# Patient Record
Sex: FEMALE | Race: BLACK OR AFRICAN AMERICAN | NOT HISPANIC OR LATINO | Employment: OTHER | URBAN - METROPOLITAN AREA
[De-identification: names, ages, dates, MRNs, and addresses within clinical notes are randomized per-mention and may not be internally consistent; named-entity substitution may affect disease eponyms.]

---

## 2017-11-27 ENCOUNTER — HISTORICAL (OUTPATIENT)
Dept: RADIOLOGY | Facility: HOSPITAL | Age: 57
End: 2017-11-27

## 2018-07-19 ENCOUNTER — HISTORICAL (OUTPATIENT)
Dept: RADIOLOGY | Facility: HOSPITAL | Age: 58
End: 2018-07-19

## 2019-03-08 ENCOUNTER — HISTORICAL (OUTPATIENT)
Dept: RADIOLOGY | Facility: HOSPITAL | Age: 59
End: 2019-03-08

## 2019-10-05 LAB
COLOR STL: ABNORMAL
CONSISTENCY STL: ABNORMAL
HEMOCCULT SP1 STL QL: POSITIVE

## 2019-10-06 LAB
COLOR STL: ABNORMAL
CONSISTENCY STL: ABNORMAL
HEMOCCULT SP2 STL QL: POSITIVE

## 2019-10-07 LAB
COLOR STL: NORMAL
CONSISTENCY STL: NORMAL

## 2019-10-08 ENCOUNTER — HISTORICAL (OUTPATIENT)
Dept: LAB | Facility: HOSPITAL | Age: 59
End: 2019-10-08

## 2020-08-31 ENCOUNTER — HISTORICAL (OUTPATIENT)
Dept: RADIOLOGY | Facility: HOSPITAL | Age: 60
End: 2020-08-31

## 2021-09-02 ENCOUNTER — HISTORICAL (OUTPATIENT)
Dept: RADIOLOGY | Facility: HOSPITAL | Age: 61
End: 2021-09-02

## 2022-09-28 ENCOUNTER — HOSPITAL ENCOUNTER (OUTPATIENT)
Dept: RADIOLOGY | Facility: HOSPITAL | Age: 62
Discharge: HOME | End: 2022-09-28
Attending: NURSE PRACTITIONER
Payer: MEDICAID

## 2022-09-28 DIAGNOSIS — Z13.820 ENCOUNTER FOR OSTEOPOROSIS SCREENING IN ASYMPTOMATIC POSTMENOPAUSAL PATIENT: ICD-10-CM

## 2022-09-28 DIAGNOSIS — Z78.0 ASYMPTOMATIC AGE-RELATED POSTMENOPAUSAL STATE: ICD-10-CM

## 2022-09-28 DIAGNOSIS — Z12.31 BREAST CANCER SCREENING BY MAMMOGRAM: ICD-10-CM

## 2022-09-28 DIAGNOSIS — Z78.0 ENCOUNTER FOR OSTEOPOROSIS SCREENING IN ASYMPTOMATIC POSTMENOPAUSAL PATIENT: ICD-10-CM

## 2022-09-28 PROCEDURE — 77063 MAMMO DIGITAL SCREENING BILAT WITH TOMO: ICD-10-PCS | Mod: 26,,, | Performed by: RADIOLOGY

## 2022-09-28 PROCEDURE — 77080 DXA BONE DENSITY AXIAL: CPT | Mod: TC

## 2022-09-28 PROCEDURE — 77067 SCR MAMMO BI INCL CAD: CPT | Mod: TC

## 2022-09-28 PROCEDURE — 77067 SCR MAMMO BI INCL CAD: CPT | Mod: 26,,, | Performed by: RADIOLOGY

## 2022-09-28 PROCEDURE — 77067 MAMMO DIGITAL SCREENING BILAT WITH TOMO: ICD-10-PCS | Mod: 26,,, | Performed by: RADIOLOGY

## 2022-09-28 PROCEDURE — 77063 BREAST TOMOSYNTHESIS BI: CPT | Mod: 26,,, | Performed by: RADIOLOGY

## 2023-10-13 ENCOUNTER — HOSPITAL ENCOUNTER (OUTPATIENT)
Dept: RADIOLOGY | Facility: HOSPITAL | Age: 63
Discharge: HOME | End: 2023-10-13
Attending: NURSE PRACTITIONER
Payer: MEDICAID

## 2023-10-13 DIAGNOSIS — Z12.31 SCREENING MAMMOGRAM, ENCOUNTER FOR: ICD-10-CM

## 2023-10-13 PROCEDURE — 77063 BREAST TOMOSYNTHESIS BI: CPT | Mod: 26,,, | Performed by: RADIOLOGY

## 2023-10-13 PROCEDURE — 77063 MAMMO DIGITAL SCREENING BILAT WITH TOMO: ICD-10-PCS | Mod: 26,,, | Performed by: RADIOLOGY

## 2023-10-13 PROCEDURE — 77067 SCR MAMMO BI INCL CAD: CPT | Mod: 26,,, | Performed by: RADIOLOGY

## 2023-10-13 PROCEDURE — 77067 MAMMO DIGITAL SCREENING BILAT WITH TOMO: ICD-10-PCS | Mod: 26,,, | Performed by: RADIOLOGY

## 2023-10-13 PROCEDURE — 77067 SCR MAMMO BI INCL CAD: CPT | Mod: TC

## 2023-11-15 ENCOUNTER — HOSPITAL ENCOUNTER (OUTPATIENT)
Dept: RADIOLOGY | Facility: HOSPITAL | Age: 63
Discharge: HOME | End: 2023-11-15
Attending: NURSE PRACTITIONER
Payer: MEDICAID

## 2023-11-15 DIAGNOSIS — R06.2 WHEEZING: ICD-10-CM

## 2023-11-15 PROCEDURE — 71046 X-RAY EXAM CHEST 2 VIEWS: CPT | Mod: TC

## 2024-01-15 ENCOUNTER — OFFICE VISIT (OUTPATIENT)
Dept: URGENT CARE | Facility: CLINIC | Age: 64
End: 2024-01-15
Payer: MEDICAID

## 2024-01-15 ENCOUNTER — HOSPITAL ENCOUNTER (EMERGENCY)
Facility: HOSPITAL | Age: 64
Discharge: HOME | End: 2024-01-15
Attending: EMERGENCY MEDICINE
Payer: MEDICAID

## 2024-01-15 VITALS
HEART RATE: 84 BPM | SYSTOLIC BLOOD PRESSURE: 139 MMHG | OXYGEN SATURATION: 99 % | TEMPERATURE: 98 F | HEIGHT: 64 IN | BODY MASS INDEX: 30.39 KG/M2 | WEIGHT: 178 LBS | DIASTOLIC BLOOD PRESSURE: 71 MMHG | RESPIRATION RATE: 17 BRPM

## 2024-01-15 VITALS
SYSTOLIC BLOOD PRESSURE: 144 MMHG | HEIGHT: 64 IN | TEMPERATURE: 99 F | BODY MASS INDEX: 30.5 KG/M2 | RESPIRATION RATE: 16 BRPM | WEIGHT: 178.63 LBS | HEART RATE: 81 BPM | DIASTOLIC BLOOD PRESSURE: 80 MMHG

## 2024-01-15 DIAGNOSIS — R10.9 ABDOMINAL PAIN, UNSPECIFIED ABDOMINAL LOCATION: ICD-10-CM

## 2024-01-15 DIAGNOSIS — R10.10 UPPER ABDOMINAL PAIN: Primary | ICD-10-CM

## 2024-01-15 DIAGNOSIS — R10.84 GENERALIZED ABDOMINAL PAIN: Primary | ICD-10-CM

## 2024-01-15 DIAGNOSIS — R11.2 NAUSEA AND VOMITING, UNSPECIFIED VOMITING TYPE: ICD-10-CM

## 2024-01-15 LAB
ALBUMIN SERPL-MCNC: 3.7 G/DL (ref 3.4–4.8)
ALBUMIN/GLOB SERPL: 1.1 RATIO (ref 1.1–2)
ALP SERPL-CCNC: 77 UNIT/L (ref 40–150)
ALT SERPL-CCNC: 45 UNIT/L (ref 0–55)
APPEARANCE UR: CLEAR
AST SERPL-CCNC: 33 UNIT/L (ref 5–34)
BACTERIA #/AREA URNS AUTO: ABNORMAL /HPF
BASOPHILS # BLD AUTO: 0.01 X10(3)/MCL
BASOPHILS NFR BLD AUTO: 0.2 %
BILIRUB SERPL-MCNC: 0.8 MG/DL
BILIRUB UR QL STRIP.AUTO: NEGATIVE
BUN SERPL-MCNC: 13.3 MG/DL (ref 9.8–20.1)
CALCIUM SERPL-MCNC: 8.9 MG/DL (ref 8.4–10.2)
CHLORIDE SERPL-SCNC: 112 MMOL/L (ref 98–107)
CO2 SERPL-SCNC: 22 MMOL/L (ref 23–31)
COLOR UR AUTO: ABNORMAL
CREAT SERPL-MCNC: 0.84 MG/DL (ref 0.55–1.02)
EOSINOPHIL # BLD AUTO: 0.08 X10(3)/MCL (ref 0–0.9)
EOSINOPHIL NFR BLD AUTO: 1.4 %
ERYTHROCYTE [DISTWIDTH] IN BLOOD BY AUTOMATED COUNT: 15.4 % (ref 11.5–17)
FLUAV AG UPPER RESP QL IA.RAPID: NOT DETECTED
FLUBV AG UPPER RESP QL IA.RAPID: NOT DETECTED
GFR SERPLBLD CREATININE-BSD FMLA CKD-EPI: >60 MLS/MIN/1.73/M2
GLOBULIN SER-MCNC: 3.3 GM/DL (ref 2.4–3.5)
GLUCOSE SERPL-MCNC: 124 MG/DL (ref 82–115)
GLUCOSE UR QL STRIP.AUTO: NORMAL
HCT VFR BLD AUTO: 37.6 % (ref 37–47)
HGB BLD-MCNC: 12.4 G/DL (ref 12–16)
HOLD SPECIMEN: NORMAL
HYALINE CASTS #/AREA URNS LPF: ABNORMAL /LPF
IMM GRANULOCYTES # BLD AUTO: 0.02 X10(3)/MCL (ref 0–0.04)
IMM GRANULOCYTES NFR BLD AUTO: 0.4 %
KETONES UR QL STRIP.AUTO: NEGATIVE
LEUKOCYTE ESTERASE UR QL STRIP.AUTO: 75
LIPASE SERPL-CCNC: 21 U/L
LYMPHOCYTES # BLD AUTO: 0.64 X10(3)/MCL (ref 0.6–4.6)
LYMPHOCYTES NFR BLD AUTO: 11.4 %
MCH RBC QN AUTO: 31.6 PG (ref 27–31)
MCHC RBC AUTO-ENTMCNC: 33 G/DL (ref 33–36)
MCV RBC AUTO: 95.7 FL (ref 80–94)
MONOCYTES # BLD AUTO: 0.2 X10(3)/MCL (ref 0.1–1.3)
MONOCYTES NFR BLD AUTO: 3.6 %
NEUTROPHILS # BLD AUTO: 4.64 X10(3)/MCL (ref 2.1–9.2)
NEUTROPHILS NFR BLD AUTO: 83 %
NITRITE UR QL STRIP.AUTO: NEGATIVE
NRBC BLD AUTO-RTO: 0 %
PH UR STRIP.AUTO: 7.5 [PH]
PLATELET # BLD AUTO: 288 X10(3)/MCL (ref 130–400)
PMV BLD AUTO: 10.6 FL (ref 7.4–10.4)
POTASSIUM SERPL-SCNC: 3.7 MMOL/L (ref 3.5–5.1)
PROT SERPL-MCNC: 7 GM/DL (ref 5.8–7.6)
PROT UR QL STRIP.AUTO: NEGATIVE
RBC # BLD AUTO: 3.93 X10(6)/MCL (ref 4.2–5.4)
RBC #/AREA URNS AUTO: ABNORMAL /HPF
RBC UR QL AUTO: NEGATIVE
SARS-COV-2 RNA RESP QL NAA+PROBE: NOT DETECTED
SODIUM SERPL-SCNC: 143 MMOL/L (ref 136–145)
SP GR UR STRIP.AUTO: >1.05 (ref 1–1.03)
SQUAMOUS #/AREA URNS LPF: ABNORMAL /HPF
UROBILINOGEN UR STRIP-ACNC: NORMAL
WBC # SPEC AUTO: 5.59 X10(3)/MCL (ref 4.5–11.5)
WBC #/AREA URNS AUTO: ABNORMAL /HPF

## 2024-01-15 PROCEDURE — 0240U COVID/FLU A&B PCR: CPT | Performed by: PHYSICIAN ASSISTANT

## 2024-01-15 PROCEDURE — 99285 EMERGENCY DEPT VISIT HI MDM: CPT | Mod: 25,27

## 2024-01-15 PROCEDURE — 25000003 PHARM REV CODE 250: Performed by: PHYSICIAN ASSISTANT

## 2024-01-15 PROCEDURE — 80053 COMPREHEN METABOLIC PANEL: CPT | Performed by: PHYSICIAN ASSISTANT

## 2024-01-15 PROCEDURE — 25500020 PHARM REV CODE 255

## 2024-01-15 PROCEDURE — 63600175 PHARM REV CODE 636 W HCPCS: Performed by: PHYSICIAN ASSISTANT

## 2024-01-15 PROCEDURE — 96361 HYDRATE IV INFUSION ADD-ON: CPT

## 2024-01-15 PROCEDURE — 99213 OFFICE O/P EST LOW 20 MIN: CPT | Mod: PBBFAC,25 | Performed by: NURSE PRACTITIONER

## 2024-01-15 PROCEDURE — 85025 COMPLETE CBC W/AUTO DIFF WBC: CPT | Performed by: PHYSICIAN ASSISTANT

## 2024-01-15 PROCEDURE — 96374 THER/PROPH/DIAG INJ IV PUSH: CPT

## 2024-01-15 PROCEDURE — 99205 OFFICE O/P NEW HI 60 MIN: CPT | Mod: S$PBB,,, | Performed by: NURSE PRACTITIONER

## 2024-01-15 PROCEDURE — 81001 URINALYSIS AUTO W/SCOPE: CPT | Performed by: PHYSICIAN ASSISTANT

## 2024-01-15 PROCEDURE — 83690 ASSAY OF LIPASE: CPT | Performed by: PHYSICIAN ASSISTANT

## 2024-01-15 RX ORDER — ONDANSETRON HYDROCHLORIDE 2 MG/ML
4 INJECTION, SOLUTION INTRAVENOUS
Status: COMPLETED | OUTPATIENT
Start: 2024-01-15 | End: 2024-01-15

## 2024-01-15 RX ORDER — DICYCLOMINE HYDROCHLORIDE 10 MG/1
10 CAPSULE ORAL 3 TIMES DAILY PRN
Qty: 30 CAPSULE | Refills: 0 | Status: SHIPPED | OUTPATIENT
Start: 2024-01-15 | End: 2024-01-25

## 2024-01-15 RX ORDER — ONDANSETRON 4 MG/1
4 TABLET, ORALLY DISINTEGRATING ORAL EVERY 8 HOURS PRN
Qty: 20 TABLET | Refills: 0 | Status: SHIPPED | OUTPATIENT
Start: 2024-01-15

## 2024-01-15 RX ADMIN — ONDANSETRON 4 MG: 2 INJECTION INTRAMUSCULAR; INTRAVENOUS at 06:01

## 2024-01-15 RX ADMIN — SODIUM CHLORIDE 1000 ML: 9 INJECTION, SOLUTION INTRAVENOUS at 06:01

## 2024-01-15 RX ADMIN — IOHEXOL 100 ML: 350 INJECTION, SOLUTION INTRAVENOUS at 06:01

## 2024-01-15 NOTE — PATIENT INSTRUCTIONS
Referred to emergency department severe upper abdominal pain and projectile vomiting and diarrhea.  Hospital level of care evaluation for severe abdominal pain r/o appendicitis, colitis or peptic ulcer

## 2024-01-15 NOTE — PROGRESS NOTES
"Subjective:      Patient ID: Anahi Parmar is a 63 y.o. female.    Vitals:  height is 5' 4" (1.626 m) and weight is 81 kg (178 lb 9.6 oz). Her oral temperature is 98.5 °F (36.9 °C). Her blood pressure is 144/80 (abnormal) and her pulse is 81. Her respiration is 16 and oxygen saturation is 100% (pended).     Chief Complaint: Abdominal Cramping (Started today), Fatigue, and Emesis    Abdominal Cramping  Associated symptoms include vomiting.   Fatigue  Associated symptoms include fatigue and vomiting.   Emesis      Patient presents with c/o generalized abdominal pain, nausea vomiting and diarrhea with bloating.  No alleviating factors. Worsening abdominal pain and bloating in last hour. Pt denies taking any medication.  Patient does report eating a lot of spicy foods in the last week.  Pt denies cough, chest pain, shortness of breath, headache, dizziness, dysuria, rectal bleeding vaginal discharge odor, or itching. Hx GERD on famotidine, Diabetes, reports compliance with medications.    Constitution: Positive for fatigue.   Gastrointestinal:  Positive for vomiting.      Objective:     Physical Exam   Constitutional: She is oriented to person, place, and time. She appears well-developed.  Non-toxic appearance. She does not appear ill. No distress.   HENT:   Head: Normocephalic and atraumatic.   Ears:   Right Ear: Tympanic membrane normal.   Left Ear: Tympanic membrane normal.   Nose: Nose normal. No purulent discharge. Right sinus exhibits no maxillary sinus tenderness and no frontal sinus tenderness. Left sinus exhibits no maxillary sinus tenderness and no frontal sinus tenderness.   Mouth/Throat: Uvula is midline. Mucous membranes are dry.   Eyes: Conjunctivae are normal. Right eye exhibits no discharge. Left eye exhibits no discharge. Extraocular movement intact   Neck: Neck supple. No neck rigidity present.   Cardiovascular: Regular rhythm and normal pulses.   Pulmonary/Chest: Effort normal and breath sounds normal. " No respiratory distress. She has no wheezes. She has no rales.   Abdominal: Normal appearance and bowel sounds are normal. She exhibits distension. Soft. There is abdominal tenderness in the right upper quadrant and left upper quadrant. There is rebound and guarding. There is no tenderness at McBurney's point, negative Hernandez's sign, no left CVA tenderness, negative Rovsing's sign, negative psoas sign, no right CVA tenderness and negative obturator sign.     Lymphadenopathy:     She has no cervical adenopathy.   Neurological: She is alert and oriented to person, place, and time.   Skin: Skin is warm, dry and not diaphoretic. Capillary refill takes less than 2 seconds.   Psychiatric: Her behavior is normal. Mood, judgment and thought content normal.   Nursing note and vitals reviewed.      Assessment:     1. Abdominal pain, unspecified abdominal location    2. Upper abdominal pain        Plan:   Hospital level of care evaluation for severe abdominal pain r/o appendicitis, colitis or peptic ulcer    Abdominal pain, unspecified abdominal location  -     Cancel: POCT Urinalysis, Dipstick, Automated, W/O Scope  -     Refer to Emergency Dept.    Upper abdominal pain  -     Refer to Emergency Dept.

## 2024-01-15 NOTE — ED PROVIDER NOTES
Encounter Date: 1/15/2024       History     Chief Complaint   Patient presents with    Abdominal Pain     Pt sent from urgent care for generalized abd pain with vomiting starting this morning; denies diarrhea.      Anahi Parmar is  a 63 y.o. female with a history of GERD and diabetes who presents to the ED complaining of generalized abdominal pain, nausea, and vomiting that started this morning. Symptoms have been constant all day with no alleviating or exacerbating factors. Reports that she feels bloated. Patient does report eating a lot of spicy foods in the last week. Denies excessive NSAID/alcohol use. Pt denies cough, chest pain, shortness of breath, headache, dizziness, dysuria, rectal bleeding vaginal discharge odor, or itching.    The history is provided by the patient.     Review of patient's allergies indicates:   Allergen Reactions    Diclofenac Swelling    Aspirin      No past medical history on file.  No past surgical history on file.  No family history on file.  Social History     Tobacco Use    Smoking status: Every Day     Types: Cigarettes    Smokeless tobacco: Never     Review of Systems   Constitutional:  Negative for fever.   HENT:  Negative for congestion and sore throat.    Respiratory:  Negative for shortness of breath.    Cardiovascular:  Negative for chest pain.   Gastrointestinal:  Positive for abdominal pain, nausea and vomiting.   Genitourinary:  Negative for dysuria.   Musculoskeletal:  Negative for back pain.   Skin:  Negative for rash.   Neurological:  Negative for weakness.   Hematological:  Does not bruise/bleed easily.       Physical Exam     Initial Vitals [01/15/24 1711]   BP Pulse Resp Temp SpO2   (!) 147/77 81 18 98.2 °F (36.8 °C) 99 %      MAP       --         Physical Exam    Nursing note and vitals reviewed.  Constitutional: She appears well-developed and well-nourished. No distress.   HENT:   Head: Normocephalic and atraumatic.   Mouth/Throat: No oropharyngeal exudate.    Eyes: EOM are normal. No scleral icterus.   Neck: Neck supple.   Normal range of motion.  Cardiovascular:  Normal rate and regular rhythm.           No murmur heard.  Pulmonary/Chest: Breath sounds normal. No respiratory distress. She has no wheezes.   Abdominal: Abdomen is soft. Bowel sounds are normal. She exhibits no distension. There is generalized abdominal tenderness and tenderness in the epigastric area and left upper quadrant. There is no rebound and no guarding.   Musculoskeletal:         General: No tenderness. Normal range of motion.      Cervical back: Normal range of motion and neck supple.     Neurological: She is alert and oriented to person, place, and time. No cranial nerve deficit.   Skin: Skin is warm and dry. Capillary refill takes less than 2 seconds. No erythema.   Psychiatric: She has a normal mood and affect. Her behavior is normal. Judgment and thought content normal.         ED Course   Procedures  Labs Reviewed   COMPREHENSIVE METABOLIC PANEL - Abnormal; Notable for the following components:       Result Value    Chloride 112 (*)     Carbon Dioxide 22 (*)     Glucose Level 124 (*)     All other components within normal limits   URINALYSIS, REFLEX TO URINE CULTURE - Abnormal; Notable for the following components:    Leukocyte Esterase, UA 75 (*)     Bacteria, UA Trace (*)     Squamous Epithelial Cells, UA Occ (*)     All other components within normal limits   CBC WITH DIFFERENTIAL - Abnormal; Notable for the following components:    RBC 3.93 (*)     MCV 95.7 (*)     MCH 31.6 (*)     MPV 10.6 (*)     All other components within normal limits   LIPASE - Normal   COVID/FLU A&B PCR - Normal    Narrative:     The Xpert Xpress SARS-CoV-2/FLU/RSV plus is a rapid, multiplexed real-time PCR test intended for the simultaneous qualitative detection and differentiation of SARS-CoV-2, Influenza A, Influenza B, and respiratory syncytial virus (RSV) viral RNA in either nasopharyngeal swab or nasal swab  specimens.         CBC W/ AUTO DIFFERENTIAL    Narrative:     The following orders were created for panel order CBC auto differential.  Procedure                               Abnormality         Status                     ---------                               -----------         ------                     CBC with Differential[[id number]]       Abnormal            Final result                 Please view results for these tests on the individual orders.   EXTRA TUBES    Narrative:     The following orders were created for panel order EXTRA TUBES.  Procedure                               Abnormality         Status                     ---------                               -----------         ------                     Light Blue Top Hold[[id number]]                             Final result               Lavender Top Hold[[id number]]                               Final result               Gold Top Hold[[id number]]                                   Final result                 Please view results for these tests on the individual orders.   LIGHT BLUE TOP HOLD   LAVENDER TOP HOLD   GOLD TOP HOLD          Imaging Results              CT Abdomen Pelvis With IV Contrast NO Oral Contrast (Final result)  Result time 01/15/24 20:01:12      Final result by Omar Reeder MD (01/15/24 20:01:12)                   Impression:      1. No acute abnormality of the abdomen and pelvis.  2. Enlarged leiomyomatous uterus.      Electronically signed by: Omar Reeder MD  Date:    01/15/2024  Time:    20:01               Narrative:    EXAMINATION:  CT ABDOMEN PELVIS WITH IV CONTRAST    CLINICAL HISTORY:  Abdominal pain, acute, nonlocalized;    TECHNIQUE:  Axial images of the abdomen and pelvis were obtained with IV contrast administration.  Coronal and sagittal reconstructions were provided.  Three dimensional and MIP images were obtained and evaluated.  Total DLP was 316 mGy-cm. Dose lowering technique and automated exposure control  were utilized for this exam.    COMPARISON:  None    FINDINGS:  The bilateral lung bases are clear.  The heart is normal in size.    There is a small area of focal fatty deposition within the liver adjacent to the fissure of ligamentum teres.  The liver is otherwise homogeneous.  The portal vein is patent.  The gallbladder, spleen, pancreas, and bilateral adrenal glands are.  The bilateral kidneys are normal.  There is no hydronephrosis or nephrolithiasis.    The stomach and small bowel are decompressed.  There is no small bowel obstruction.  The appendix is normal.  The colon is normal.  There is enlarged leiomyomatous uterus.  The largest fibroid in the fundus measures approximately 6.4 cm.  The urinary bladder is normal.  There is no pelvic or retroperitoneal lymphadenopathy.  The aorta is nonaneurysmal.  There is no lytic or blastic osseous lesion.                                       Medications   ondansetron injection 4 mg (4 mg Intravenous Given 1/15/24 1807)   sodium chloride 0.9% bolus 1,000 mL 1,000 mL (0 mLs Intravenous Stopped 1/15/24 1913)   iohexoL (OMNIPAQUE 350) 350 mg iodine/mL injection (100 mLs Intravenous Given 1/15/24 1845)     Medical Decision Making  Differential: gastroenteritis, cholecystitis, pancreatitis, UTI    ED management: pt resting comfortably in NAD. HDS and afebrile. Generalized abdominal tenderness, worse in epigastrium and LUQ. No rebound or guarding. No CVA tenderness.     Amount and/or Complexity of Data Reviewed  Labs: ordered.  Radiology: ordered.    Risk  Prescription drug management.               ED Course as of 01/15/24 2019   Mon Oscar 15, 2024   1921 Notified by RT that radiologists power is out and he is no longer able to read imaging. [place] will begin in approximately 1-2 hrs.  [KD]   1939 Pt states she feels significantly better and is requesting discharge. She does not want to wait for final CT read. Suspect gastroenteritis. She has not vomited since receiving  zofran. Will discharge and call if any abnormalities.  [KD]      ED Course User Index  [KD] Denisha Villanueva PA-C                           Clinical Impression:  Final diagnoses:  [R10.84] Generalized abdominal pain (Primary)  [R11.2] Nausea and vomiting, unspecified vomiting type          ED Disposition Condition    Discharge Stable          ED Prescriptions       Medication Sig Dispense Start Date End Date Auth. Provider    ondansetron (ZOFRAN-ODT) 4 MG TbDL Take 1 tablet (4 mg total) by mouth every 8 (eight) hours as needed (nausea/vomiting). 20 tablet 1/15/2024 -- Denisha Villanueva PA-C    dicyclomine (BENTYL) 10 MG capsule Take 1 capsule (10 mg total) by mouth 3 (three) times daily as needed (abdominal cramps). 30 capsule 1/15/2024 1/25/2024 Denisha Villanueva PA-C          Follow-up Information       Follow up With Specialties Details Why Contact Info    [facility] - Emergency Dept Emergency Medicine  If symptoms worsen [address] [facility] [address] [phone]    PCP  In 3 days Hospital follow up              Denisha Villanueva PA-C  01/15/24 2020

## 2024-07-08 ENCOUNTER — OFFICE VISIT (OUTPATIENT)
Dept: URGENT CARE | Facility: CLINIC | Age: 64
End: 2024-07-08
Payer: MEDICAID

## 2024-07-08 VITALS
SYSTOLIC BLOOD PRESSURE: 135 MMHG | BODY MASS INDEX: 30.75 KG/M2 | HEART RATE: 71 BPM | DIASTOLIC BLOOD PRESSURE: 81 MMHG | RESPIRATION RATE: 20 BRPM | HEIGHT: 64 IN | WEIGHT: 180.13 LBS | OXYGEN SATURATION: 100 % | TEMPERATURE: 98 F

## 2024-07-08 DIAGNOSIS — M26.609 TMJ (TEMPOROMANDIBULAR JOINT DISORDER): Primary | ICD-10-CM

## 2024-07-08 DIAGNOSIS — J02.9 SORE THROAT: ICD-10-CM

## 2024-07-08 LAB
CTP QC/QA: YES
GLUCOSE SERPL-MCNC: 83 MG/DL (ref 70–110)
MOLECULAR STREP A: NEGATIVE

## 2024-07-08 PROCEDURE — 82962 GLUCOSE BLOOD TEST: CPT | Mod: PBBFAC | Performed by: FAMILY MEDICINE

## 2024-07-08 PROCEDURE — 87651 STREP A DNA AMP PROBE: CPT | Mod: PBBFAC | Performed by: FAMILY MEDICINE

## 2024-07-08 PROCEDURE — 99215 OFFICE O/P EST HI 40 MIN: CPT | Mod: PBBFAC | Performed by: FAMILY MEDICINE

## 2024-07-08 PROCEDURE — 99213 OFFICE O/P EST LOW 20 MIN: CPT | Mod: S$PBB,,, | Performed by: FAMILY MEDICINE

## 2024-07-08 RX ORDER — GABAPENTIN 400 MG/1
400 CAPSULE ORAL 3 TIMES DAILY
COMMUNITY

## 2024-07-08 RX ORDER — MECLIZINE HYDROCHLORIDE 25 MG/1
25 TABLET ORAL
COMMUNITY
Start: 2022-01-07

## 2024-07-08 RX ORDER — FAMOTIDINE 40 MG/1
40 TABLET, FILM COATED ORAL
COMMUNITY
Start: 2024-01-15

## 2024-07-08 RX ORDER — PREDNISONE 20 MG/1
20 TABLET ORAL 2 TIMES DAILY
Qty: 5 TABLET | Refills: 0 | Status: SHIPPED | OUTPATIENT
Start: 2024-07-08 | End: 2024-07-13

## 2024-07-08 RX ORDER — GLIPIZIDE 2.5 MG/1
5 TABLET, EXTENDED RELEASE ORAL
COMMUNITY

## 2024-07-08 NOTE — PROGRESS NOTES
"Subjective:       Patient ID: Anahi Parmar is a 64 y.o. female.    Vitals:  height is 5' 4" (1.626 m) and weight is 81.7 kg (180 lb 1.9 oz). Her oral temperature is 97.6 °F (36.4 °C). Her blood pressure is 135/81 and her pulse is 71. Her respiration is 20 and oxygen saturation is 100%.     Chief Complaint: Sore Throat (X2 weeks  throat pain, pain when swallowing, and headaches. States no congestion or cough.)    Patient with what sounds like recurrent right mandibular pain, points to the right TMJ.  States this has been present for years.  Denies sore throat, no difficulty swallowing.  No fever.  Followed at [facility], [place]    All other systems are negative    Chart reviewed    Objective:   Physical Exam   Constitutional: She appears well-developed.  Non-toxic appearance. She does not appear ill. No distress.   HENT:   Head: Atraumatic.   Ears:   Right Ear: Tympanic membrane, external ear and ear canal normal.   Left Ear: Tympanic membrane, external ear and ear canal normal.   Nose: No purulent discharge. Right sinus exhibits no maxillary sinus tenderness and no frontal sinus tenderness. Left sinus exhibits no maxillary sinus tenderness and no frontal sinus tenderness.   Mouth/Throat: Uvula is midline.      Comments: Right TMJ with faint click and tenderness.  No soft tissue swelling.  Parotid within normal limits.  No tenderness of temporal artery.  Eyes: Right eye exhibits no discharge. Left eye exhibits no discharge. Extraocular movement intact   Neck: Neck supple. No neck rigidity present.   Cardiovascular: Regular rhythm.   Pulmonary/Chest: Effort normal and breath sounds normal. No respiratory distress. She has no wheezes. She has no rales.   Lymphadenopathy:     She has no cervical adenopathy.   Neurological: She is alert.   Skin: Skin is warm, dry and not diaphoretic.   Psychiatric: Her behavior is normal.   Nursing note and vitals reviewed.      Results for orders placed or performed in visit on 07/08/24   POCT " Strep A, Molecular   Result Value Ref Range    Molecular Strep A, POC Negative Negative     Acceptable Yes    POCT Glucose, Hand-Held Device   Result Value Ref Range    POC Glucose 83 70 - 110 MG/DL       Assessment:     1. TMJ (temporomandibular joint disorder)    2. Sore throat            Plan:   Discussed what is likely TMJ on the right.  Patient allergic to NSAIDs.  Will give a course of prednisone which she has tolerated in the past.  She does have a history of diabetes but glucose has been well controlled.  Instructed her to see dentist at [facility] for further evaluation.  Return to urgent care if we can help.      TMJ (temporomandibular joint disorder)  -     predniSONE (DELTASONE) 20 MG tablet; Take 1 tablet (20 mg total) by mouth 2 (two) times daily. for 5 days  Dispense: 5 tablet; Refill: 0    Sore throat  -     POCT Strep A, Molecular  -     POCT Glucose, Hand-Held Device        Please note: This chart was completed via voice to text dictation. It may contain typographical/word recognition errors. If there are any questions, please contact the provider for final clarification.

## 2024-09-22 ENCOUNTER — HOSPITAL ENCOUNTER (EMERGENCY)
Facility: HOSPITAL | Age: 64
Discharge: HOME | End: 2024-09-22
Attending: INTERNAL MEDICINE
Payer: MEDICAID

## 2024-09-22 VITALS
DIASTOLIC BLOOD PRESSURE: 84 MMHG | HEIGHT: 64 IN | OXYGEN SATURATION: 100 % | TEMPERATURE: 98 F | SYSTOLIC BLOOD PRESSURE: 136 MMHG | RESPIRATION RATE: 20 BRPM | WEIGHT: 181.69 LBS | HEART RATE: 67 BPM | BODY MASS INDEX: 31.02 KG/M2

## 2024-09-22 DIAGNOSIS — G89.29 CHRONIC BILATERAL LOW BACK PAIN WITH BILATERAL SCIATICA: Primary | ICD-10-CM

## 2024-09-22 DIAGNOSIS — M54.41 CHRONIC BILATERAL LOW BACK PAIN WITH BILATERAL SCIATICA: Primary | ICD-10-CM

## 2024-09-22 DIAGNOSIS — M54.42 CHRONIC BILATERAL LOW BACK PAIN WITH BILATERAL SCIATICA: Primary | ICD-10-CM

## 2024-09-22 LAB — POCT GLUCOSE: 61 MG/DL (ref 70–110)

## 2024-09-22 PROCEDURE — 63600175 PHARM REV CODE 636 W HCPCS: Performed by: INTERNAL MEDICINE

## 2024-09-22 PROCEDURE — 99284 EMERGENCY DEPT VISIT MOD MDM: CPT | Mod: 25

## 2024-09-22 PROCEDURE — 82962 GLUCOSE BLOOD TEST: CPT

## 2024-09-22 PROCEDURE — 25000003 PHARM REV CODE 250: Performed by: INTERNAL MEDICINE

## 2024-09-22 PROCEDURE — 96372 THER/PROPH/DIAG INJ SC/IM: CPT | Performed by: INTERNAL MEDICINE

## 2024-09-22 RX ORDER — HYDROCODONE BITARTRATE AND ACETAMINOPHEN 7.5; 325 MG/1; MG/1
1 TABLET ORAL ONCE
Status: COMPLETED | OUTPATIENT
Start: 2024-09-22 | End: 2024-09-22

## 2024-09-22 RX ORDER — METHOCARBAMOL 750 MG/1
1500 TABLET, FILM COATED ORAL 3 TIMES DAILY
Qty: 30 TABLET | Refills: 0 | Status: SHIPPED | OUTPATIENT
Start: 2024-09-22 | End: 2024-09-27

## 2024-09-22 RX ORDER — METHOCARBAMOL 500 MG/1
1500 TABLET, FILM COATED ORAL
Status: COMPLETED | OUTPATIENT
Start: 2024-09-22 | End: 2024-09-22

## 2024-09-22 RX ORDER — DEXAMETHASONE SODIUM PHOSPHATE 4 MG/ML
8 INJECTION, SOLUTION INTRA-ARTICULAR; INTRALESIONAL; INTRAMUSCULAR; INTRAVENOUS; SOFT TISSUE
Status: COMPLETED | OUTPATIENT
Start: 2024-09-22 | End: 2024-09-22

## 2024-09-22 RX ORDER — HYDROCODONE BITARTRATE AND ACETAMINOPHEN 5; 325 MG/1; MG/1
1 TABLET ORAL EVERY 6 HOURS PRN
Qty: 12 TABLET | Refills: 0 | Status: SHIPPED | OUTPATIENT
Start: 2024-09-22

## 2024-09-22 RX ADMIN — METHOCARBAMOL 1500 MG: 500 TABLET ORAL at 01:09

## 2024-09-22 RX ADMIN — HYDROCODONE BITARTRATE AND ACETAMINOPHEN 1 TABLET: 7.5; 325 TABLET ORAL at 01:09

## 2024-09-22 RX ADMIN — DEXAMETHASONE SODIUM PHOSPHATE 8 MG: 4 INJECTION, SOLUTION INTRA-ARTICULAR; INTRALESIONAL; INTRAMUSCULAR; INTRAVENOUS; SOFT TISSUE at 01:09

## 2024-09-22 NOTE — ED PROVIDER NOTES
Encounter Date: 9/22/2024       History     Chief Complaint   Patient presents with    Back Pain     Presents with chronic back pain.  was told she had some bone to bone on her back, by neurosurgery at [facility], offered her surgery but she refused. Pt  was told 50% chance she will not walk after surgery for which she refused. States sometimes get flare ups with some improvement after steroid shots. Hx of DM.  Pt denies incontinence, saddle analgesia, focal weakness or urinary retention. Denies fever, chills, dysuria or hematuria    The history is provided by the patient.     Review of patient's allergies indicates:   Allergen Reactions    Diclofenac Swelling    Aspirin      Past Medical History:   Diagnosis Date    Diabetes mellitus, type 2      No past surgical history on file.  Family History   Problem Relation Name Age of Onset    Lung cancer Mother      No Known Problems Father       Social History     Tobacco Use    Smoking status: Every Day     Current packs/day: 0.50     Types: Cigarettes    Smokeless tobacco: Never   Substance Use Topics    Alcohol use: Yes     Comment: occ    Drug use: Never     Review of Systems   Musculoskeletal:  Positive for back pain.   All other systems reviewed and are negative.      Physical Exam     Initial Vitals [09/22/24 0052]   BP Pulse Resp Temp SpO2   (!) 152/80 62 20 97.7 °F (36.5 °C) 100 %      MAP       --         Physical Exam    Nursing note and vitals reviewed.  Constitutional: She appears well-developed and well-nourished. No distress.   HENT:   Head: Normocephalic and atraumatic.   Mouth/Throat: Oropharynx is clear and moist.   Eyes: Conjunctivae are normal. Pupils are equal, round, and reactive to light.   Neck: Neck supple.   Normal range of motion.  Cardiovascular:  Normal rate, regular rhythm, normal heart sounds and intact distal pulses.           Pulmonary/Chest: Breath sounds normal.   Abdominal: Abdomen is soft. Bowel sounds are normal. She exhibits no  distension. There is no abdominal tenderness. There is no rebound and no guarding.   Musculoskeletal:         General: No tenderness or edema. Normal range of motion.      Cervical back: Normal range of motion and neck supple.     Neurological: She is alert and oriented to person, place, and time. She has normal strength. GCS score is 15. GCS eye subscore is 4. GCS verbal subscore is 5. GCS motor subscore is 6.   Skin: Skin is warm and dry. No rash noted.   Psychiatric: Her behavior is normal. Thought content normal.         ED Course   Procedures  Labs Reviewed   POCT GLUCOSE - Abnormal       Result Value    POCT Glucose 61 (*)    POCT GLUCOSE, HAND-HELD DEVICE          Imaging Results    None          Medications   HYDROcodone-acetaminophen 7.5-325 mg per tablet 1 tablet (1 tablet Oral Given 9/22/24 0145)   methocarbamoL tablet 1,500 mg (1,500 mg Oral Given 9/22/24 0124)   dexAMETHasone injection 8 mg (8 mg Intramuscular Given 9/22/24 0124)     Medical Decision Making  Amount and/or Complexity of Data Reviewed  External Data Reviewed: radiology.     Details: FINDINGS:  The bilateral lung bases are clear.  The heart is normal in size.     There is a small area of focal fatty deposition within the liver adjacent to the fissure of ligamentum teres.  The liver is otherwise homogeneous.  The portal vein is patent.  The gallbladder, spleen, pancreas, and bilateral adrenal glands are.  The bilateral kidneys are normal.  There is no hydronephrosis or nephrolithiasis.     The stomach and small bowel are decompressed.  There is no small bowel obstruction.  The appendix is normal.  The colon is normal.  There is enlarged leiomyomatous uterus.  The largest fibroid in the fundus measures approximately 6.4 cm.  The urinary bladder is normal.  There is no pelvic or retroperitoneal lymphadenopathy.  The aorta is nonaneurysmal.  There is no lytic or blastic osseous lesion.     Impression:     1. No acute abnormality of the abdomen  and pelvis.  2. Enlarged leiomyomatous uterus.        Electronically signed by:Omar Reeder MD  Date:                                            01/15/2024  Time:                                           20:01    Labs: ordered. Decision-making details documented in ED Course.    Risk  Prescription drug management.      Additional MDM:   Differential Diagnosis:   Lumbar fracture, spinal stenosis, epidural abscess, spine osteomyelitis, MS, cauda equina, among others                                      Clinical Impression:  Final diagnoses:  [M54.42, M54.41, G89.29] Chronic bilateral low back pain with bilateral sciatica (Primary)          ED Disposition Condition    Discharge Stable          ED Prescriptions       Medication Sig Dispense Start Date End Date Auth. Provider    methocarbamoL (ROBAXIN) 750 MG Tab Take 2 tablets (1,500 mg total) by mouth 3 (three) times daily. for 5 days 30 tablet 9/22/2024 9/27/2024 Earnest Brady MD    HYDROcodone-acetaminophen (NORCO) 5-325 mg per tablet Take 1 tablet by mouth every 6 (six) hours as needed. 12 tablet 9/22/2024 -- Earnest Brady MD          Follow-up Information       Follow up With Specialties Details Why Contact Info    [facility] - Emergency Dept Emergency Medicine  If symptoms worsen [address] [id number] [phone]             Earnest Brady MD  09/22/24 3172

## 2024-09-24 ENCOUNTER — HOSPITAL ENCOUNTER (EMERGENCY)
Facility: HOSPITAL | Age: 64
Discharge: HOME | End: 2024-09-24
Attending: STUDENT IN AN ORGANIZED HEALTH CARE EDUCATION/TRAINING PROGRAM
Payer: MEDICAID

## 2024-09-24 VITALS
BODY MASS INDEX: 28.17 KG/M2 | WEIGHT: 165 LBS | DIASTOLIC BLOOD PRESSURE: 78 MMHG | OXYGEN SATURATION: 100 % | HEART RATE: 58 BPM | TEMPERATURE: 98 F | RESPIRATION RATE: 18 BRPM | HEIGHT: 64 IN | SYSTOLIC BLOOD PRESSURE: 122 MMHG

## 2024-09-24 DIAGNOSIS — T50.905A MEDICATION REACTION, INITIAL ENCOUNTER: Primary | ICD-10-CM

## 2024-09-24 DIAGNOSIS — R11.0 NAUSEA: ICD-10-CM

## 2024-09-24 DIAGNOSIS — Z87.39 HISTORY OF BACK PAIN: ICD-10-CM

## 2024-09-24 PROCEDURE — 99284 EMERGENCY DEPT VISIT MOD MDM: CPT

## 2024-09-24 RX ORDER — PREDNISONE 20 MG/1
TABLET ORAL
Qty: 7 TABLET | Refills: 0 | Status: SHIPPED | OUTPATIENT
Start: 2024-09-24 | End: 2024-10-03

## 2024-09-24 RX ORDER — ONDANSETRON 4 MG/1
4 TABLET, ORALLY DISINTEGRATING ORAL EVERY 8 HOURS PRN
Qty: 9 TABLET | Refills: 0 | Status: SHIPPED | OUTPATIENT
Start: 2024-09-24 | End: 2024-09-27

## 2024-09-24 NOTE — DISCHARGE INSTRUCTIONS
Clear liquids tonight, advance diet tomorrow.  Follow up with your primary care physician in 3-5 days for follow up evaluation.  Take medication as prescribed.

## 2024-09-24 NOTE — ED PROVIDER NOTES
Encounter Date: 9/24/2024       History     Chief Complaint   Patient presents with    Fatigue     PT IN /AASI W CO WEAKNESS W NAUSEA AND HA AFTER TAKING PAIN MED AND MUSCLE RELAXER TOGETHER AT 3 PM.  CBG EN ROUTE 98.  IV TO RT ARM /EMS. RECEIVED 4MG ZOFRAN IVP /EMS.      Pt is a 64 y.o. female who presents to the [facility] ED for evaluation after she took Norco with a muscle relaxer and became nauseated. Pt reports being given the medication after being seen in the [facility] ED on 9/22 for pain symptoms. Denies chest pain, SOB, weakness, dizziness, fever, abdominal pain, or loss of bowel or bladder control. Admits her initial symptoms completely resolved after being given a medication by EMS in route to the hospital. Hx of DM.      Review of patient's allergies indicates:   Allergen Reactions    Diclofenac Swelling    Aspirin      Past Medical History:   Diagnosis Date    Diabetes mellitus, type 2      History reviewed. No pertinent surgical history.  Family History   Problem Relation Name Age of Onset    Lung cancer Mother      No Known Problems Father       Social History     Tobacco Use    Smoking status: Every Day     Current packs/day: 0.50     Average packs/day: 0.5 packs/day for 24.7 years (12.4 ttl pk-yrs)     Types: Cigarettes     Start date: 2000    Smokeless tobacco: Never   Substance Use Topics    Alcohol use: Yes     Comment: occ    Drug use: Never     Review of Systems   Constitutional:  Negative for chills, diaphoresis, fatigue and fever.   HENT:  Negative for facial swelling, rhinorrhea, sinus pressure, sinus pain, sore throat and trouble swallowing.    Respiratory:  Negative for cough, chest tightness, shortness of breath and wheezing.    Cardiovascular:  Negative for chest pain, palpitations and leg swelling.   Gastrointestinal:  Positive for nausea. Negative for abdominal pain, diarrhea and vomiting.   Genitourinary:  Negative for dysuria, flank pain, frequency, hematuria and urgency.   Musculoskeletal:   Negative for arthralgias, back pain, joint swelling and myalgias.   Skin:  Negative for color change and rash.   Neurological:  Negative for dizziness, syncope, weakness and light-headedness.   Hematological:  Does not bruise/bleed easily.   All other systems reviewed and are negative.      Physical Exam     Initial Vitals [09/24/24 1749]   BP Pulse Resp Temp SpO2   (!) 146/85 60 18 97.9 °F (36.6 °C) 99 %      MAP       --         Physical Exam    Nursing note and vitals reviewed.  Constitutional: She appears well-developed and well-nourished.   HENT:   Head: Normocephalic and atraumatic.   Nose: Nose normal.   Mouth/Throat: Oropharynx is clear and moist.   Eyes: Conjunctivae and EOM are normal. Pupils are equal, round, and reactive to light.   Neck: Neck supple.   Normal range of motion.  Cardiovascular:  Normal rate, regular rhythm, normal heart sounds and intact distal pulses.           Pulmonary/Chest: Effort normal and breath sounds normal. No respiratory distress. She has no wheezes. She has no rhonchi. She has no rales. She exhibits no tenderness.   Abdominal: Abdomen is soft and flat. Bowel sounds are normal. She exhibits no distension. There is no abdominal tenderness. There is no rebound, no guarding, no tenderness at McBurney's point and negative Hernandez's sign. negative psoas sign  Musculoskeletal:         General: Normal range of motion.      Cervical back: Normal range of motion and neck supple.     Neurological: She is alert and oriented to person, place, and time. She has normal strength and normal reflexes.   Skin: Skin is warm and dry. Capillary refill takes less than 2 seconds.   Psychiatric: She has a normal mood and affect. Her speech is normal and behavior is normal. Judgment and thought content normal.         ED Course   Procedures  Labs Reviewed - No data to display       Imaging Results    None          Medications - No data to display  Medical Decision  Making  Differential:  Nausea  Medication reaction               ED Course as of 09/24/24 1846   Tue Sep 24, 2024   1836 I have discussed with pt in detail that her symptoms of nausea are consistent with common medication reactions when Norco (a narcotic) is taken with Methocarbamol (a muscle relaxer). I have offered pt diagnostic testing to rule out any additional underlying causes of her symptoms. Pt declined. Reports complete resolution of nausea symptoms with medication provided per EMS. I have stressed to pt that I can place her on nausea medication while she is on her medication for pain control however she is to return to the [facility] ED immediately if she develops chest pain, SOB, or fever. I have additionally stressed her need to follow up with her PCP as planned. Understanding and agreement voiced per pt.  [JA]      ED Course User Index  [JA] Christiano Smiley Jr., FNP                           Clinical Impression:  Final diagnoses:  [T50.905A] Medication reaction, initial encounter (Primary)  [R11.0] Nausea  [Z87.39] History of back pain          ED Disposition Condition    Discharge Stable          ED Prescriptions       Medication Sig Dispense Start Date End Date Auth. Provider    ondansetron (ZOFRAN-ODT) 4 MG TbDL Take 1 tablet (4 mg total) by mouth every 8 (eight) hours as needed (Nausea). 9 tablet 9/24/2024 9/27/2024 Christiano Smiley Jr., FNP    predniSONE (DELTASONE) 20 MG tablet Take 1 tablet (20 mg total) by mouth once daily for 5 days, THEN 0.5 tablets (10 mg total) once daily for 4 days. 7 tablet 9/24/2024 10/3/2024 Christiano Smiley Jr., FNP          Follow-up Information       Follow up With Specialties Details Why Contact Info    [facility] - Emergency Dept Emergency Medicine In 3 days As needed, If symptoms worsen [address] [facility] [address] [phone]             Christiano Smiley Jr., FNP  09/24/24 1846

## 2025-06-01 ENCOUNTER — HOSPITAL ENCOUNTER (EMERGENCY)
Facility: HOSPITAL | Age: 65
Discharge: HOME | End: 2025-06-01
Attending: EMERGENCY MEDICINE
Payer: MEDICAID

## 2025-06-01 VITALS
TEMPERATURE: 98 F | WEIGHT: 167.63 LBS | DIASTOLIC BLOOD PRESSURE: 83 MMHG | HEIGHT: 64 IN | BODY MASS INDEX: 28.62 KG/M2 | RESPIRATION RATE: 16 BRPM | HEART RATE: 64 BPM | OXYGEN SATURATION: 100 % | SYSTOLIC BLOOD PRESSURE: 166 MMHG

## 2025-06-01 DIAGNOSIS — M54.42 CHRONIC BILATERAL LOW BACK PAIN WITH BILATERAL SCIATICA: Primary | ICD-10-CM

## 2025-06-01 DIAGNOSIS — M54.41 CHRONIC BILATERAL LOW BACK PAIN WITH BILATERAL SCIATICA: Primary | ICD-10-CM

## 2025-06-01 DIAGNOSIS — G89.29 CHRONIC BILATERAL LOW BACK PAIN WITH BILATERAL SCIATICA: Primary | ICD-10-CM

## 2025-06-01 PROCEDURE — 99284 EMERGENCY DEPT VISIT MOD MDM: CPT | Mod: 25

## 2025-06-01 PROCEDURE — 25000003 PHARM REV CODE 250

## 2025-06-01 PROCEDURE — 96372 THER/PROPH/DIAG INJ SC/IM: CPT

## 2025-06-01 PROCEDURE — 63600175 PHARM REV CODE 636 W HCPCS

## 2025-06-01 RX ORDER — HYDROCODONE BITARTRATE AND ACETAMINOPHEN 5; 325 MG/1; MG/1
1 TABLET ORAL EVERY 6 HOURS PRN
Qty: 16 TABLET | Refills: 0 | Status: SHIPPED | OUTPATIENT
Start: 2025-06-01

## 2025-06-01 RX ORDER — DEXAMETHASONE SODIUM PHOSPHATE 4 MG/ML
8 INJECTION, SOLUTION INTRA-ARTICULAR; INTRALESIONAL; INTRAMUSCULAR; INTRAVENOUS; SOFT TISSUE
Status: COMPLETED | OUTPATIENT
Start: 2025-06-01 | End: 2025-06-01

## 2025-06-01 RX ORDER — METHOCARBAMOL 500 MG/1
500 TABLET, FILM COATED ORAL 3 TIMES DAILY
Qty: 15 TABLET | Refills: 0 | Status: SHIPPED | OUTPATIENT
Start: 2025-06-01 | End: 2025-06-06

## 2025-06-01 RX ORDER — HYDROCODONE BITARTRATE AND ACETAMINOPHEN 7.5; 325 MG/1; MG/1
1 TABLET ORAL ONCE
Refills: 0 | Status: COMPLETED | OUTPATIENT
Start: 2025-06-01 | End: 2025-06-01

## 2025-06-01 RX ADMIN — HYDROCODONE BITARTRATE AND ACETAMINOPHEN 1 TABLET: 7.5; 325 TABLET ORAL at 02:06

## 2025-06-01 RX ADMIN — DEXAMETHASONE SODIUM PHOSPHATE 8 MG: 4 INJECTION, SOLUTION INTRA-ARTICULAR; INTRALESIONAL; INTRAMUSCULAR; INTRAVENOUS; SOFT TISSUE at 02:06

## 2025-06-02 NOTE — ED PROVIDER NOTES
Encounter Date: 6/1/2025       History     Chief Complaint   Patient presents with    Back Pain     Pt in with complaints of back pain that is now making her mouth swelling.  Reports that she takes Benadryl for the swelling but it is not working.     A 65 y.o. female patient with a history of DM presents to the ED with back pain, patient states it is chronic but when it gets like this it is hard for her to talk because of the pain. Patient is whispering during encounter but is able to talk without trouble. Patient does not have any throat or oral swelling. She denies incontinence, numbness, new trauma or injury.       The history is provided by the patient.     Review of patient's allergies indicates:   Allergen Reactions    Diclofenac Swelling    Aspirin      Past Medical History:   Diagnosis Date    Diabetes mellitus, type 2      No past surgical history on file.  Family History   Problem Relation Name Age of Onset    Lung cancer Mother      No Known Problems Father       Social History[1]  Review of Systems   Constitutional:  Negative for chills and fever.   Eyes:  Negative for visual disturbance.   Respiratory:  Negative for shortness of breath.    Cardiovascular:  Negative for chest pain.   Gastrointestinal:  Negative for nausea and vomiting.   Genitourinary:  Negative for difficulty urinating and dysuria.   Musculoskeletal:  Positive for arthralgias, back pain and myalgias.   Skin:  Negative for color change and rash.   Neurological:  Negative for weakness and headaches.   Hematological:  Does not bruise/bleed easily.   Psychiatric/Behavioral:  Negative for confusion.    All other systems reviewed and are negative.      Physical Exam     Initial Vitals [06/01/25 1316]   BP Pulse Resp Temp SpO2   (!) 183/84 71 14 98.1 °F (36.7 °C) 100 %      MAP       --         Physical Exam    Nursing note and vitals reviewed.  Constitutional: She appears well-developed and well-nourished.   HENT:   Head: Normocephalic and  atraumatic.   Right Ear: External ear normal.   Left Ear: External ear normal.   Nose: Nose normal. Mouth/Throat: Oropharynx is clear and moist. No trismus in the jaw. No uvula swelling. No oropharyngeal exudate, posterior oropharyngeal edema or posterior oropharyngeal erythema.   Eyes: Conjunctivae and EOM are normal.   Neck: Neck supple.   Cardiovascular:  Normal rate, regular rhythm and normal heart sounds.     Exam reveals no gallop and no friction rub.       No murmur heard.  Pulmonary/Chest: Breath sounds normal. No respiratory distress. She has no wheezes. She has no rhonchi. She has no rales.   Musculoskeletal:      Cervical back: Neck supple.      Lumbar back: Tenderness and bony tenderness present. No swelling, edema, deformity, signs of trauma, lacerations or spasms. Normal range of motion. Positive right straight leg raise test and positive left straight leg raise test. No scoliosis.        Back:      Neurological: She is alert and oriented to person, place, and time. GCS score is 15. GCS eye subscore is 4. GCS verbal subscore is 5. GCS motor subscore is 6.   Skin: Skin is warm and dry. Capillary refill takes less than 2 seconds.         ED Course   Procedures  Labs Reviewed - No data to display       Imaging Results    None          Medications   dexAMETHasone injection 8 mg (8 mg Intramuscular Given 6/1/25 0554)   HYDROcodone-acetaminophen 7.5-325 mg per tablet 1 tablet (1 tablet Oral Given 6/1/25 6045)     Medical Decision Making  A 65 y.o. female patient with a history of DM presents to the ED with back pain, patient states it is chronic but when it gets like this it is hard for her to talk because of the pain. Patient is whispering during encounter but is able to talk without trouble. Patient does not have any throat or oral swelling. She denies incontinence, numbness, new trauma or injury.       Patient's condition improved with the following Medications  dexAMETHasone injection 8 mg (8 mg  Intramuscular Given 6/1/25 1455)  HYDROcodone-acetaminophen 7.5-325 mg per tablet 1 tablet (1 tablet Oral Given 6/1/25 1455)    Clinical impression:  Chronic bilateral low back pain with bilateral sciatica (Primary)    Patient is non-toxic appearing and tolerating nutritional intake. Patient's vital signs and clinical condition are stable for DC with ED Prescriptions     Medication Sig Dispense Start Date End Date Auth. Provider    HYDROcodone-acetaminophen (NORCO) 5-325 mg per tablet Take 1 tablet by   mouth every 6 (six) hours as needed for Pain. 16 tablet 6/1/2025 --   Kecia Sanders PA    methocarbamoL (ROBAXIN) 500 MG Tab Take 1 tablet (500 mg total) by mouth   3 (three) times daily. for 5 days 15 tablet 6/1/2025 6/6/2025 Kecia Sanders PA         Follow-up: PCP or Internal medicine clinic within 3 days  Referrals made: none    Strict follow-up precautions given. Patient verbalizes understanding of treatment plan and ED return precautions.       Risk  Prescription drug management.  Risk Details: Given strict ED return precautions. I have spoken with the patient and/or caregivers. I have explained the patient's condition, diagnoses and treatment plan based on the information available to me at this time. I have answered the patient's and/or caregiver's questions and addressed any concerns. The patient and/or caregivers have as good an understanding of the patient's diagnosis, condition and treatment plan as can be expected at this point. The patient's condition is stable and appropriate for discharge from the emergency department.      The patient will pursue further outpatient evaluation with the primary care physician or other designated or consulting physician as outlined in the discharge instructions. The patient and/or caregivers are agreeable to this plan of care and follow-up instructions have been explained in detail. The patient and/or caregivers have received these instructions in written  format and have expressed an understanding of the discharge instructions. The patient and/or caregivers are aware that any significant change in condition or worsening of symptoms should prompt an immediate return to this or the closest emergency department or a call to 911.               Additional MDM:   Differential Diagnosis:   Other: The following diagnoses were also considered and will be evaluated: Contusion, Strain and Sprain.                                   Clinical Impression:  Final diagnoses:  [M54.42, M54.41, G89.29] Chronic bilateral low back pain with bilateral sciatica (Primary)          ED Disposition Condition    Discharge Stable          ED Prescriptions       Medication Sig Dispense Start Date End Date Auth. Provider    HYDROcodone-acetaminophen (NORCO) 5-325 mg per tablet Take 1 tablet by mouth every 6 (six) hours as needed for Pain. 16 tablet 6/1/2025 -- Kecia Sanders PA    methocarbamoL (ROBAXIN) 500 MG Tab Take 1 tablet (500 mg total) by mouth 3 (three) times daily. for 5 days 15 tablet 6/1/2025 6/6/2025 Kecia Sanders PA          Follow-up Information       Follow up With Specialties Details Why Contact Info    [facility] - Emergency Dept Emergency Medicine Go to  If symptoms worsen, As needed [address] [facility] [address] [phone]    Your primary care provider  Go in 3 days                     [1]   Social History  Tobacco Use    Smoking status: Every Day     Current packs/day: 0.50     Average packs/day: 0.5 packs/day for 25.4 years (12.7 ttl pk-yrs)     Types: Cigarettes     Start date: 2000    Smokeless tobacco: Never   Substance Use Topics    Alcohol use: Yes     Comment: occ    Drug use: Never        Kecia Sanders PA  06/02/25 9498

## 2025-07-04 ENCOUNTER — LAB VISIT (OUTPATIENT)
Dept: LAB | Facility: HOSPITAL | Age: 65
End: 2025-07-04
Attending: INTERNAL MEDICINE
Payer: MEDICAID

## 2025-07-04 DIAGNOSIS — Z86.0100 HX OF COLONIC POLYPS: Primary | ICD-10-CM

## 2025-07-04 DIAGNOSIS — Z12.11 SPECIAL SCREENING FOR MALIGNANT NEOPLASMS, COLON: ICD-10-CM

## 2025-07-04 LAB
COLOR STL: NORMAL
CONSISTENCY STL: NORMAL
HEMOCCULT SP1 STL QL: NEGATIVE
HEMOCCULT SP2 STL QL: NEGATIVE
HEMOCCULT SP3 STL QL: NEGATIVE

## 2025-07-04 PROCEDURE — 82270 OCCULT BLOOD FECES: CPT
